# Patient Record
(demographics unavailable — no encounter records)

---

## 2025-02-15 NOTE — ASSESSMENT
[FreeTextEntry1] : Classic Hodgkin's lymphoma presenting as bulky mediastinal mass, stage IIb. Deauville 3 PET scan has been achieved following 2 cycles of ABVD. Ova retrieval completed and menstrual cycle remains intact.  Follow up PET following 6th cycle confirms CR, and repeat PET/CT showed continuing resolution changes, ELDER.  Repeat CT in December 2021 shows sclerotic changes in C7-T5 vertebral bodies, nonspecific.  CT 7/23 ELDER CT 1/10/24 ELDER  -Continue to monitor Hodgkin's disease in remission -Start regular mammography in one year, ~2026.  -F/u in one year, then transition care to survivorship program.

## 2025-02-15 NOTE — ADDENDUM
[FreeTextEntry1] : Documented by Jose M Tiwari acting as scribe for Dr. Kaur on  02/12/2025.   All Medical record entries made by the Scribe were at my, Dr. Kaur's, direction and personally dictated by me on 02/12/2025. I have reviewed the chart and agree that the record accurately reflects my personal performance of the history, physical exam, assessment and plan. I have also personally directed, reviewed, and agreed with the discharge instructions.

## 2025-02-15 NOTE — HISTORY OF PRESENT ILLNESS
[de-identified] :  34 yo female sent to [facility] in May 2020 for further eval of large anterior mediastinal mass seen on CT scan.  Patient reported having fatigue, swollen face and neck, and lightheadedness for about 3-4 weeks prior to presentation   Also with mild SOB intermittently with exertion. She had a brief episode of syncope in early March.  Assoc with 20 lbs weight loss in the prior two months and loss of appetite. She went to her PMD's office who ordered blood work and a cxr which revealed a large mass.  Then sent for a CT scan that confirmed a large heterogenous mediastinal mass.14.9 x 7.9 x 9.1 cm  + SVC compression. Pateint was started on Decadron at 4 q6h in the hospital with significant resolution of symptoms Patient had a biopsy of ant mediastinal mass, on 5/29/20, + for classic Hodgkin lymphoma.  She was discharged on decadron 4 q8 h  .      Follow-up PET CT scan following 2 cycles of ABVD IMPRESSION: Since PET/CT June 11, 2020:  1. Decreased size and FDG avidity of anterior mediastinal mass with residual FDG avidity equal to hepatic background (Deauville 3). Resolved FDG avidity and decreased size of cervical/supraclavicular and cardiophrenic lymph nodes.  2. FDG avid cutaneous focus left axillary fold, indeterminate. Suggest correlation with direct inspection.  3. Minimally FDG avid diffuse subtle groundglass attenuation in the lungs bilaterally possibly infectious/inflammatory.  4. New FDG throughout the bone marrow, probably due to recent administration of colony stimulating factors. Clinical correlation suggested.  Patient has completed AVD x 4 cycles after the original ABVD x 2. PET confirmed CR: .   [de-identified] : Pt presents for follow up. Feels well. Remains in complete remission. She mentions a sore throat for three weeks, previously had a persistent cough. She was trialed on Amoxicillin after she went to urgent care. Symptoms have improved since.  Otherwise, asymptomatic.

## 2025-02-15 NOTE — HISTORY OF PRESENT ILLNESS
[de-identified] :  34 yo female sent to [facility] in May 2020 for further eval of large anterior mediastinal mass seen on CT scan.  Patient reported having fatigue, swollen face and neck, and lightheadedness for about 3-4 weeks prior to presentation   Also with mild SOB intermittently with exertion. She had a brief episode of syncope in early March.  Assoc with 20 lbs weight loss in the prior two months and loss of appetite. She went to her PMD's office who ordered blood work and a cxr which revealed a large mass.  Then sent for a CT scan that confirmed a large heterogenous mediastinal mass.14.9 x 7.9 x 9.1 cm  + SVC compression. Pateint was started on Decadron at 4 q6h in the hospital with significant resolution of symptoms Patient had a biopsy of ant mediastinal mass, on 5/29/20, + for classic Hodgkin lymphoma.  She was discharged on decadron 4 q8 h  .      Follow-up PET CT scan following 2 cycles of ABVD IMPRESSION: Since PET/CT June 11, 2020:  1. Decreased size and FDG avidity of anterior mediastinal mass with residual FDG avidity equal to hepatic background (Deauville 3). Resolved FDG avidity and decreased size of cervical/supraclavicular and cardiophrenic lymph nodes.  2. FDG avid cutaneous focus left axillary fold, indeterminate. Suggest correlation with direct inspection.  3. Minimally FDG avid diffuse subtle groundglass attenuation in the lungs bilaterally possibly infectious/inflammatory.  4. New FDG throughout the bone marrow, probably due to recent administration of colony stimulating factors. Clinical correlation suggested.  Patient has completed AVD x 4 cycles after the original ABVD x 2. PET confirmed CR: .   [de-identified] : Pt presents for follow up. Feels well. Remains in complete remission. She mentions a sore throat for three weeks, previously had a persistent cough. She was trialed on Amoxicillin after she went to urgent care. Symptoms have improved since.  Otherwise, asymptomatic.